# Patient Record
Sex: MALE | Race: OTHER | URBAN - METROPOLITAN AREA
[De-identification: names, ages, dates, MRNs, and addresses within clinical notes are randomized per-mention and may not be internally consistent; named-entity substitution may affect disease eponyms.]

---
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
## 2020-01-01 NOTE — ED PEDIATRIC TRIAGE NOTE - WEIGHT GM
Patient is 39 year old Male with a significant PMHx of asthma presents to the ED with complaints of wheezing and productive cough x 2 days. Patient also endorsing shortness of breath and chest tightness. Associated congestion. Denies fever, recent travel, leg pain/swelling or any other acute complaints. Patient reports using albuterol multiple times today without relief. Patient never intubated, nor used BiPAP or CPAP mask. Patient on ventolin for asthma alone.
[id number]

## 2020-02-14 NOTE — ED BEHAVIORAL HEALTH ASSESSMENT NOTE - DESCRIPTION
Traveling alone Denies 16 Irish-American M, living with parents and 3 younger siblings in [facility], between 11th and 12th grades in school, endorses good grades in 9th and 10th grades, but failing global health, english and gym currently. Father works as jeweler, mother is homemaker and does some driving for part time work. calm and cooperative  VITAL SIGNS (Last 24 hrs):  T(C): 37 (04-19-18 @ 00:12), Max: 37 (04-19-18 @ 00:12)  HR: 66 (04-19-18 @ 00:12) (66 - 78)  BP: 147/77 (04-19-18 @ 00:12) (126/72 - 147/77)  RR: 18 (04-19-18 @ 00:12) (16 - 18)  SpO2: 100% (04-19-18 @ 00:12) (100% - 100%)  Wt(kg): --  Daily     Daily     I&O's Summary 17 Romanian-American male, living in [facility], bio parents and 3 younger siblings in [facility]. currently in GED program through [employer]. Father works as jeweler, mother is homemaker and does some driving for part time work.

## 2021-11-16 NOTE — ED PROVIDER NOTE - CROS ED GI ALL NEG
Alert-The patient is alert, awake and responds to voice. The patient is oriented to time, place, and person. The triage nurse is able to obtain subjective information.
negative...

## 2022-04-21 NOTE — ED PEDIATRIC NURSE NOTE - RESPIRATORY WDL
room air Breathing spontaneous and unlabored. Breath sounds clear and equal bilaterally with regular rhythm.

## 2022-08-22 NOTE — ED PEDIATRIC TRIAGE NOTE - CHIEF COMPLAINT QUOTE
Jose Masterson     This post procedure note has been dictated. Full report in imaging tab.    Charlie Patel MD  8/22/2022 1:21 PM    
Patient brought in by mother because she believes he is doing drugs. Patient has a history of going to rehab for doing acid, glendy, mushrooms and marijuana. Mother reports she thinks he is doing drugs because he has been disrespectful, laughing at her and screaming at her. Mother appears anxious and reports she has both her parents in the MICU right now. Patient reports aside from smoking marijuana yesterday he has not done any drugs. Patient does not appear to high or intoxicated as the mother claims he is right now. Mother reports he stole money from them. Patient denies. Mother wants him drug tested.  notified.

## 2023-06-08 NOTE — ED BEHAVIORAL HEALTH ASSESSMENT NOTE - NS ED BHA ED COURSE FOUR POINT RESTRAINTS IN ED YN
Requested Prescriptions   Pending Prescriptions Disp Refills     methylPREDNISolone (MEDROL DOSEPAK) 4 MG tablet therapy pack 21 tablet 1     Sig: Follow Package Directions       There is no refill protocol information for this order        Last office visit: Visit date not found ; last virtual visit: Visit date not found with prescribing provider:  EDUARDO ALFONSO    Future Office Visit:          Nabila Shaws  [facility]       No

## 2023-10-02 NOTE — ED BEHAVIORAL HEALTH ASSESSMENT NOTE - THOUGHT ASSOCIATIONS
Chano Ritter is requesting a refill of amLODIPine (NORVASC) 10 MG tablet for a 30 day supply and would like sent to local pharmacy.     Please send to the [facility] on [address]      
Writer called and spoke with patient. Patient advised that Rx was sent in on 9/18/23. Patient will contact the pharmacy.   
Normal

## 2024-09-28 NOTE — ED BEHAVIORAL HEALTH ASSESSMENT NOTE - NS ED BHA COCAINE
Pt with elevated ALP, AST, ALT, no clinical signs of cholestatic process, more likely 2/2 septic arthritis  - RUQ US: distended gallbladder, no stones or obstruction  - ID: repeat CT CAP- no source of abd or pelvic infection, chest redemonstrated surgical changes None known

## 2025-03-12 NOTE — ED PEDIATRIC TRIAGE NOTE - MEANS OF ARRIVAL
You can access the FollowMyHealth Patient Portal offered by [facility] by registering at the following website: [email or web address]. By joining [employer]’s FollowMyHealth portal, you will also be able to view your health information using other applications (apps) compatible with our system. ambulatory

## 2025-04-12 ENCOUNTER — EMERGENCY (EMERGENCY)
Facility: HOSPITAL | Age: 25
LOS: 1 days | End: 2025-04-12
Attending: EMERGENCY MEDICINE
Payer: MEDICAID

## 2025-04-12 VITALS
SYSTOLIC BLOOD PRESSURE: 143 MMHG | RESPIRATION RATE: 16 BRPM | OXYGEN SATURATION: 98 % | TEMPERATURE: 97 F | DIASTOLIC BLOOD PRESSURE: 83 MMHG | HEART RATE: 82 BPM

## 2025-04-12 RX ORDER — CLONAZEPAM 0.5 MG/1
0.5 TABLET ORAL ONCE
Refills: 0 | Status: DISCONTINUED | OUTPATIENT
Start: 2025-04-12 | End: 2025-04-12

## 2025-04-12 RX ORDER — METHYLPHENIDATE HCL 20 MG
5 TABLET, EXTENDED RELEASE ORAL ONCE
Refills: 0 | Status: DISCONTINUED | OUTPATIENT
Start: 2025-04-12 | End: 2025-04-12

## 2025-04-12 RX ADMIN — Medication 0.1 MILLIGRAM(S): at 22:52

## 2025-04-12 RX ADMIN — CLONAZEPAM 0.5 MILLIGRAM(S): 0.5 TABLET ORAL at 22:52

## 2025-04-12 RX ADMIN — Medication 5 MILLIGRAM(S): at 22:52

## 2025-04-12 NOTE — ED ADULT NURSE NOTE - OBJECTIVE STATEMENT
patient under custody endorsing generalized chest pain x today. patient denies SOB, N, V, and diarrhea. hx of HTN

## 2025-04-12 NOTE — ED PROVIDER NOTE - OBJECTIVE STATEMENT
24-year-old male presents in police custody requesting multiple medications -   namely clonidine, Klonopin, Ritalin. Patient does not mention anything about chest pain.  He appears in no distress.

## 2025-04-12 NOTE — ED PROVIDER NOTE - NSDCPRINTRESULTS_ED_ALL_ED
Patient requests all Lab, Cardiology, and Radiology Results on their Discharge Instructions Breath sounds clear and equal bilaterally.

## 2025-04-12 NOTE — ED PROVIDER NOTE - PATIENT PORTAL LINK FT
You can access the FollowMyHealth Patient Portal offered by [facility] by registering at the following website: [email or web address]. By joining [employer]’s FollowMyHealth portal, you will also be able to view your health information using other applications (apps) compatible with our system.

## 2025-04-12 NOTE — ED PROVIDER NOTE - CLINICAL SUMMARY MEDICAL DECISION MAKING FREE TEXT BOX
24-year-old male presents in police custody requesting several medications that he has not taken today.  He requested 5 mg of clonidine, 15 mg of Klonopin, Ritalin 5 mg.  I conducted a search on prescription monitoring program and saw no prescriptions for this patient.  I also saw no prescriptions from previous visits.  I did give him low-dose of clonidine and Klonopin as well as Ritalin.  Patient was discharged back into police custody.  His EKG showed no ischemic changes.

## 2025-04-12 NOTE — ED ADULT TRIAGE NOTE - CHIEF COMPLAINT QUOTE
BIBA as per ems from 112 percent c/o non radiating chest pain x today denies SOB under police custody denies any PMH BIBA as per ems from 112 percent c/o non radiating chest pain x today denies SOB under police custody  PMH substance abuse HTN, on Clonidine, Zoloft, Methadone

## 2025-04-12 NOTE — ED ADULT NURSE NOTE - CHIEF COMPLAINT QUOTE
BIBA as per ems from 112 percent c/o non radiating chest pain x today denies SOB under police custody  PMH substance abuse HTN, on Clonidine, Zoloft, Methadone

## 2025-04-12 NOTE — ED ADULT NURSE NOTE - NSFALLUNIVINTERV_ED_ALL_ED
Bed/Stretcher in lowest position, wheels locked, appropriate side rails in place/Call bell, personal items and telephone in reach/Instruct patient to call for assistance before getting out of bed/chair/stretcher/Non-slip footwear applied when patient is off stretcher/[place] to call system/Physically safe environment - no spills, clutter or unnecessary equipment/Purposeful proactive rounding/Room/bathroom lighting operational, light cord in reach